# Patient Record
Sex: FEMALE | Race: WHITE | NOT HISPANIC OR LATINO | URBAN - METROPOLITAN AREA
[De-identification: names, ages, dates, MRNs, and addresses within clinical notes are randomized per-mention and may not be internally consistent; named-entity substitution may affect disease eponyms.]

---

## 2019-11-14 ENCOUNTER — APPOINTMENT (RX ONLY)
Dept: URBAN - METROPOLITAN AREA CLINIC 176 | Facility: CLINIC | Age: 49
Setting detail: DERMATOLOGY
End: 2019-11-14

## 2019-11-14 DIAGNOSIS — Z41.9 ENCOUNTER FOR PROCEDURE FOR PURPOSES OTHER THAN REMEDYING HEALTH STATE, UNSPECIFIED: ICD-10-CM

## 2019-11-14 PROCEDURE — ? BOTOX

## 2019-11-14 NOTE — PROCEDURE: BOTOX
Additional Area 2 Units: 0
Show Depressor Anguli Units: Yes
Dilution (U/0.1 Cc): 5
Show Right And Left Periorbital Units: No
Periorbital Skin Units: 10
Consent: Written consent obtained. Risks include but not limited to lid/brow ptosis, bruising, swelling, diplopia, temporary effect, incomplete chemical denervation.
Lot #: [id number]
Post-Care Instructions: Patient instructed to not lie down for 4 hours and limit physical activity for 24 hours. Patient instructed not to travel by airplane for 48 hours.
Detail Level: Detailed
Expiration Date (Month Year): 03/2022
Additional Area 1 Location: Orbicularis oris
Price (Use Numbers Only, No Special Characters Or $): 600

## 2019-11-14 NOTE — HPI: COSMETIC (BOTOX)
Have You Had Botox Before?: has had botox
Additional History: Patent had Botox done at another clinic in may.
When Was Your Last Botox Treatment?: 05/19/2019

## 2020-07-09 ENCOUNTER — APPOINTMENT (RX ONLY)
Dept: URBAN - METROPOLITAN AREA CLINIC 176 | Facility: CLINIC | Age: 50
Setting detail: DERMATOLOGY
End: 2020-07-09

## 2020-07-09 DIAGNOSIS — Z41.9 ENCOUNTER FOR PROCEDURE FOR PURPOSES OTHER THAN REMEDYING HEALTH STATE, UNSPECIFIED: ICD-10-CM

## 2020-07-09 PROCEDURE — ? BOTOX

## 2020-07-09 NOTE — PROCEDURE: BOTOX
Show Ucl Units: No
Glabellar Complex Units: 10
L Brow Units: 0
Show Additional Area 2: Yes
Additional Area 1 Location: Perioral
Dilution (U/0.1 Cc): 5
Detail Level: Detailed
Post-Care Instructions: Patient instructed to not lie down for 4 hours and limit physical activity for 24 hours. Patient instructed not to travel by airplane for 48 hours.
Expiration Date (Month Year): 11/2022
Price (Use Numbers Only, No Special Characters Or $): 400
Lot #: [id number]
Consent: Written consent obtained. Risks include but not limited to lid/brow ptosis, bruising, swelling, diplopia, temporary effect, incomplete chemical denervation.

## 2020-11-18 ENCOUNTER — APPOINTMENT (RX ONLY)
Dept: URBAN - METROPOLITAN AREA CLINIC 176 | Facility: CLINIC | Age: 50
Setting detail: DERMATOLOGY
End: 2020-11-18

## 2020-11-18 DIAGNOSIS — Z41.9 ENCOUNTER FOR PROCEDURE FOR PURPOSES OTHER THAN REMEDYING HEALTH STATE, UNSPECIFIED: ICD-10-CM

## 2020-11-18 PROCEDURE — ? DYSPORT

## 2020-11-18 NOTE — PROCEDURE: DYSPORT
Show Periorbital Units: Yes
Additional Comments: Has been doing botox 7 years, try dysport
Additional Area 2 Units: 10
Left Pupillary Line Units: 0
Depressor Anguli Oris Units: 15
Detail Level: Detailed
Lot #: [id number]
Periorbital Skin Units: 30
Price (Use Numbers Only, No Special Characters Or $): 369
Consent: Written consent obtained. Risks include but not limited to lid/brow ptosis, bruising, swelling, diplopia, temporary effect, incomplete chemical denervation.
Additional Area 3 Location: chin
Forehead Units: 20
Show Ucl Units: No
Glabellar Complex Units: 30
Post-Care Instructions: Patient instructed to not lie down for 4 hours and limit physical activity for 24 hours. Patient instructed not to travel by airplane for 48 hours.
Additional Area 1 Location: Submandibular gland
Additional Area 2 Location: Orbicularis oris
Expiration Date (Month Year): 12/31/2020

## 2021-03-18 ENCOUNTER — APPOINTMENT (RX ONLY)
Dept: URBAN - METROPOLITAN AREA CLINIC 176 | Facility: CLINIC | Age: 51
Setting detail: DERMATOLOGY
End: 2021-03-18

## 2021-03-18 DIAGNOSIS — Z41.9 ENCOUNTER FOR PROCEDURE FOR PURPOSES OTHER THAN REMEDYING HEALTH STATE, UNSPECIFIED: ICD-10-CM

## 2021-03-18 PROCEDURE — ? DYSPORT

## 2021-03-18 NOTE — PROCEDURE: DYSPORT
Show Periorbital Units: Yes
Additional Comments: Has been doing botox 7 years, try dysport
Additional Area 2 Units: 10
Left Pupillary Line Units: 0
Depressor Anguli Oris Units: 15
Detail Level: Detailed
Lot #: [id number]
Periorbital Skin Units: 30
Price (Use Numbers Only, No Special Characters Or $): 368
Consent: Written consent obtained. Risks include but not limited to lid/brow ptosis, bruising, swelling, diplopia, temporary effect, incomplete chemical denervation.
Additional Area 3 Location: chin
Forehead Units: 20
Show Ucl Units: No
Glabellar Complex Units: 30
Post-Care Instructions: Patient instructed to not lie down for 4 hours and limit physical activity for 24 hours. Patient instructed not to travel by airplane for 48 hours.
Additional Area 1 Location: Submandibular gland
Additional Area 2 Location: Orbicularis oris
Expiration Date (Month Year): 12/31/2020

## 2021-06-25 ENCOUNTER — APPOINTMENT (RX ONLY)
Dept: URBAN - METROPOLITAN AREA CLINIC 176 | Facility: CLINIC | Age: 51
Setting detail: DERMATOLOGY
End: 2021-06-25

## 2021-06-25 DIAGNOSIS — Z41.9 ENCOUNTER FOR PROCEDURE FOR PURPOSES OTHER THAN REMEDYING HEALTH STATE, UNSPECIFIED: ICD-10-CM

## 2021-06-25 PROCEDURE — ? DYSPORT

## 2021-06-25 NOTE — PROCEDURE: DYSPORT
Additional Area 2 Units: 15
Show Ucl Units: No
Show Additional Area 2: Yes
Additional Area 5 Units: 0
Price (Use Numbers Only, No Special Characters Or $): 400
Post-Care Instructions: Patient instructed to not lie down for 4 hours and limit physical activity for 24 hours. Patient instructed not to travel by airplane for 48 hours.
Glabellar Complex Units: 30
Additional Area 3 Location: chin
Detail Level: Detailed
Periorbital Skin Units: 22.5
Lot #: [id number]
Consent: Written consent obtained. Risks include but not limited to lid/brow ptosis, bruising, swelling, diplopia, temporary effect, incomplete chemical denervation.
Expiration Date (Month Year): 01/31/2022
Additional Area 2 Location: Orbicularis oris
Forehead Units: 30
Additional Area 3 Units: 7.5
Dilution (U/ 0.1cc): 10

## 2021-11-17 ENCOUNTER — APPOINTMENT (RX ONLY)
Dept: URBAN - METROPOLITAN AREA CLINIC 176 | Facility: CLINIC | Age: 51
Setting detail: DERMATOLOGY
End: 2021-11-17

## 2021-11-17 VITALS — WEIGHT: 130 LBS | HEIGHT: 70 IN

## 2021-11-17 DIAGNOSIS — Z41.9 ENCOUNTER FOR PROCEDURE FOR PURPOSES OTHER THAN REMEDYING HEALTH STATE, UNSPECIFIED: ICD-10-CM

## 2021-11-17 PROCEDURE — ? DYSPORT

## 2021-11-17 PROCEDURE — ? TEOSYAL RHA 4 INJECTION

## 2021-11-17 NOTE — PROCEDURE: DYSPORT
Show Right And Left Periorbital Units: No
R Brow Units: 0
Show Additional Area 3: Yes
Additional Area 1 Location: Chin
Consent: Written consent obtained. Risks include but not limited to lid/brow ptosis, bruising, swelling, diplopia, temporary effect, incomplete chemical denervation.
Additional Area 1 Units: 7.5
Expiration Date (Month Year): 04/30/2022
Post-Care Instructions: Patient instructed to not lie down for 4 hours and limit physical activity for 24 hours. Patient instructed not to travel by airplane for 48 hours.
Glabellar Complex Units: 30
Lot #: [id number]
Periorbital Skin Units: 35
Price (Use Numbers Only, No Special Characters Or $): 400
Detail Level: Detailed
Additional Area 2 Location: Orbicularis oris
Forehead Units: 30
Dilution (U/ 0.1cc): 10

## 2021-11-17 NOTE — PROCEDURE: TEOSYAL RHA 4 INJECTION
Brows Filler  Volume In Cc: 0
Include Cannula Information In Note?: No
Detail Level: Detailed
Anesthesia Volume In Cc: 0.5
Map Statment: See Attach Map for Complete Details
Expiration Date (Month Year): 2023-12-12
Filler: Teosyal RHA 4
Price (Use Numbers Only, No Special Characters Or $): 032
Procedural Text: The filler was administered to the treatment areas noted above.
Consent: Written consent obtained. Risks include but not limited to bruising, beading, irregular texture, ulceration, infection, allergic reaction, scar formation, incomplete augmentation, temporary nature, procedural pain.
Anesthesia Type: 1% lidocaine with epinephrine
Additional Anesthesia Volume In Cc: 6
Post-Care Instructions: Patient instructed to apply ice to reduce swelling.
Lot #: [id number]
Number Of Syringes (Required For Inventory): 1

## 2022-03-10 ENCOUNTER — APPOINTMENT (RX ONLY)
Dept: URBAN - METROPOLITAN AREA CLINIC 176 | Facility: CLINIC | Age: 52
Setting detail: DERMATOLOGY
End: 2022-03-10

## 2022-03-10 DIAGNOSIS — Z41.9 ENCOUNTER FOR PROCEDURE FOR PURPOSES OTHER THAN REMEDYING HEALTH STATE, UNSPECIFIED: ICD-10-CM

## 2022-03-10 PROCEDURE — ? DYSPORT

## 2022-03-10 PROCEDURE — ? TEOSYAL RHA 3 INJECTION

## 2022-03-10 NOTE — PROCEDURE: TEOSYAL RHA 3 INJECTION
Mid Face Filler  Volume In Cc: 0
Nasolabial Folds Filler Volume In Cc: 0.6
Price (Use Numbers Only, No Special Characters Or $): 086
Anesthesia Type: 1% lidocaine with epinephrine
Consent: Written consent obtained. Risks include but not limited to bruising, beading, irregular texture, ulceration, infection, allergic reaction, scar formation, incomplete augmentation, temporary nature, procedural pain.
Additional Anesthesia Volume In Cc: 6
Procedural Text: The filler was administered to the treatment areas noted above.
Use Map Statement For Sites (Optional): No
Number Of Syringes (Required For Inventory): 1
Marionette Lines Filler  Volume In Cc: 0.4
Include Cannula Size?: 25G
Lot #: [id number]
Include Cannula Information In Note?: Yes
Post-Care Instructions: Patient instructed to apply ice to reduce swelling.
Detail Level: Detailed
Anesthesia Volume In Cc: 0.5
Map Statment: See Attach Map for Complete Details
Include Cannula Length?: 1.5 inch
Expiration Date (Month Year): 2024-03-26
Filler: Teosyal RHA 3

## 2022-03-10 NOTE — PROCEDURE: DYSPORT
Show Right And Left Periorbital Units: No
R Brow Units: 0
Show Additional Area 3: Yes
Additional Area 1 Location: Chin
Consent: Written consent obtained. Risks include but not limited to lid/brow ptosis, bruising, swelling, diplopia, temporary effect, incomplete chemical denervation.
Additional Area 1 Units: 15
Expiration Date (Month Year): 8/31/2022
Post-Care Instructions: Patient instructed to not lie down for 4 hours and limit physical activity for 24 hours. Patient instructed not to travel by airplane for 48 hours.
Lot #: [id number]
Periorbital Skin Units: 35
Price (Use Numbers Only, No Special Characters Or $): 400
Detail Level: Detailed
Additional Area 2 Location: Orbicularis oris
Forehead Units: 25
Dilution (U/ 0.1cc): 10
Additional Comments: Put less in glabella, more around mouth.

## 2022-06-09 ENCOUNTER — APPOINTMENT (RX ONLY)
Dept: URBAN - METROPOLITAN AREA CLINIC 176 | Facility: CLINIC | Age: 52
Setting detail: DERMATOLOGY
End: 2022-06-09

## 2022-06-09 VITALS — HEIGHT: 70 IN | WEIGHT: 135 LBS

## 2022-06-09 DIAGNOSIS — Z41.9 ENCOUNTER FOR PROCEDURE FOR PURPOSES OTHER THAN REMEDYING HEALTH STATE, UNSPECIFIED: ICD-10-CM

## 2022-06-09 PROCEDURE — ? DYSPORT

## 2022-06-09 NOTE — PROCEDURE: DYSPORT
Show Right And Left Periorbital Units: No
R Brow Units: 0
Show Additional Area 3: Yes
Additional Area 1 Location: Chin
Consent: Written consent obtained. Risks include but not limited to lid/brow ptosis, bruising, swelling, diplopia, temporary effect, incomplete chemical denervation.
Additional Area 1 Units: 15
Expiration Date (Month Year): 9/30/2022
Post-Care Instructions: Patient instructed to not lie down for 4 hours and limit physical activity for 24 hours. Patient instructed not to travel by airplane for 48 hours.
Lot #: [id number]
Periorbital Skin Units: 35
Price (Use Numbers Only, No Special Characters Or $): 400
Detail Level: Detailed
Additional Area 2 Location: Orbicularis oris
Forehead Units: 25
Dilution (U/ 0.1cc): 10
Additional Comments: Put less in glabella, more around mouth.

## 2022-10-18 ENCOUNTER — APPOINTMENT (RX ONLY)
Dept: URBAN - METROPOLITAN AREA CLINIC 176 | Facility: CLINIC | Age: 52
Setting detail: DERMATOLOGY
End: 2022-10-18

## 2022-10-18 DIAGNOSIS — Z41.9 ENCOUNTER FOR PROCEDURE FOR PURPOSES OTHER THAN REMEDYING HEALTH STATE, UNSPECIFIED: ICD-10-CM

## 2022-10-18 PROCEDURE — ? DYSPORT

## 2022-10-18 NOTE — PROCEDURE: DYSPORT
Show Right And Left Periorbital Units: No
R Brow Units: 0
Show Additional Area 3: Yes
Additional Area 1 Location: Chin
Consent: Written consent obtained. Risks include but not limited to lid/brow ptosis, bruising, swelling, diplopia, temporary effect, incomplete chemical denervation.
Additional Area 1 Units: 10
Expiration Date (Month Year): 2/28/23
Post-Care Instructions: Patient instructed to not lie down for 4 hours and limit physical activity for 24 hours. Patient instructed not to travel by airplane for 48 hours.
Glabellar Complex Units: 30
Lot #: [id number]
Periorbital Skin Units: 30
Price (Use Numbers Only, No Special Characters Or $): 400
Detail Level: Detailed
Additional Area 2 Location: Orbicularis oris
Additional Comments: Put more in glabella, less around mouth

## 2022-11-23 ENCOUNTER — APPOINTMENT (RX ONLY)
Dept: URBAN - METROPOLITAN AREA CLINIC 176 | Facility: CLINIC | Age: 52
Setting detail: DERMATOLOGY
End: 2022-11-23

## 2022-11-23 VITALS — WEIGHT: 135 LBS | HEIGHT: 70 IN

## 2022-11-23 DIAGNOSIS — Z41.9 ENCOUNTER FOR PROCEDURE FOR PURPOSES OTHER THAN REMEDYING HEALTH STATE, UNSPECIFIED: ICD-10-CM

## 2022-11-23 PROCEDURE — ? TEOSYAL RHA 4 INJECTION

## 2022-11-23 PROCEDURE — ? ADDITIONAL NOTES

## 2022-11-23 PROCEDURE — ? TEOSYAL RHA 3 INJECTION

## 2022-11-23 NOTE — PROCEDURE: TEOSYAL RHA 4 INJECTION
Cheeks Filler Volume In Cc: 0
Detail Level: Detailed
Additional Area 1 Volume In Cc: 0.3
Include Cannula Information In Note?: No
Lot #: [id number]
Post-Care Instructions: Patient instructed to apply ice to reduce swelling.
Anesthesia Volume In Cc: 0.5
Expiration Date (Month Year): 2024-02-21
Filler: Teosyal RHA 4
Map Statment: See Attach Map for Complete Details
Additional Area 1 Location: chin
Anesthesia Type: 1% lidocaine with epinephrine
Nasolabial Folds Filler Volume In Cc: 0.2
Number Of Syringes (Required For Inventory): 1
Consent: Written consent obtained. Risks include but not limited to bruising, beading, irregular texture, ulceration, infection, allergic reaction, scar formation, incomplete augmentation, temporary nature, procedural pain.
Procedural Text: The filler was administered to the treatment areas noted above.
Additional Anesthesia Volume In Cc: 6

## 2022-11-23 NOTE — PROCEDURE: ADDITIONAL NOTES
Additional Notes: Hylenex injection done for bruising on the right jaw. [id number]; 11/2022.
Render Risk Assessment In Note?: yes
Detail Level: Detailed

## 2022-11-23 NOTE — PROCEDURE: TEOSYAL RHA 3 INJECTION
Cheeks Filler Volume In Cc: 1
Anesthesia Volume In Cc: 0.5
Brows Filler  Volume In Cc: 0
Post-Care Instructions: Patient instructed to apply ice to reduce swelling.
Include Cannula Information In Note?: No
Lot #: [id number]
Expiration Date (Month Year): 2025-04-04
Map Statment: See Attach Map for Complete Details
Detail Level: Detailed
Price (Use Numbers Only, No Special Characters Or $): 013
Filler: Teosyal RHA 3
Consent: Written consent obtained. Risks include but not limited to bruising, beading, irregular texture, ulceration, infection, allergic reaction, scar formation, incomplete augmentation, temporary nature, procedural pain.
Anesthesia Type: 1% lidocaine with epinephrine
Additional Anesthesia Volume In Cc: 6
Procedural Text: The filler was administered to the treatment areas noted above.
Map Statment: See Attach Map for Complete Details
Expiration Date (Month Year): 2024-11-15
Price (Use Numbers Only, No Special Characters Or $): 431
Lot #: [id number]

## 2023-02-02 ENCOUNTER — APPOINTMENT (RX ONLY)
Dept: URBAN - METROPOLITAN AREA CLINIC 176 | Facility: CLINIC | Age: 53
Setting detail: DERMATOLOGY
End: 2023-02-02

## 2023-02-02 DIAGNOSIS — Z41.9 ENCOUNTER FOR PROCEDURE FOR PURPOSES OTHER THAN REMEDYING HEALTH STATE, UNSPECIFIED: ICD-10-CM

## 2023-02-02 PROCEDURE — ? DYSPORT

## 2023-02-02 NOTE — PROCEDURE: DYSPORT
Show Right And Left Periorbital Units: No
R Brow Units: 0
Show Additional Area 3: Yes
Additional Area 1 Location: Chin
Consent: Written consent obtained. Risks include but not limited to lid/brow ptosis, bruising, swelling, diplopia, temporary effect, incomplete chemical denervation.
Expiration Date (Month Year): 2/28/23
Post-Care Instructions: Patient instructed to not lie down for 4 hours and limit physical activity for 24 hours. Patient instructed not to travel by airplane for 48 hours.
Glabellar Complex Units: 30
Lot #: [id number]
Periorbital Skin Units: 30
Price (Use Numbers Only, No Special Characters Or $): 400
Detail Level: Detailed
Additional Area 2 Location: Orbicularis oris
Dilution (U/ 0.1cc): 10
Additional Comments: Put more in glabella, less around mouth
Depressor Anguli Oris Units: 15

## 2023-05-24 ENCOUNTER — APPOINTMENT (RX ONLY)
Dept: URBAN - METROPOLITAN AREA CLINIC 176 | Facility: CLINIC | Age: 53
Setting detail: DERMATOLOGY
End: 2023-05-24

## 2023-05-24 ENCOUNTER — RX ONLY (OUTPATIENT)
Age: 53
Setting detail: RX ONLY
End: 2023-05-24

## 2023-05-24 DIAGNOSIS — Z41.9 ENCOUNTER FOR PROCEDURE FOR PURPOSES OTHER THAN REMEDYING HEALTH STATE, UNSPECIFIED: ICD-10-CM

## 2023-05-24 PROCEDURE — ? DYSPORT

## 2023-05-24 RX ORDER — BIMATOPROST 0.3 MG/ML
SOLUTION/ DROPS OPHTHALMIC
Qty: 5 | Refills: 11 | Status: ERX | COMMUNITY
Start: 2023-05-24

## 2023-05-24 NOTE — PROCEDURE: DYSPORT
Show Right And Left Periorbital Units: No
R Brow Units: 0
Show Additional Area 3: Yes
Additional Area 1 Location: Chin
Consent: Written consent obtained. Risks include but not limited to lid/brow ptosis, bruising, swelling, diplopia, temporary effect, incomplete chemical denervation.
Expiration Date (Month Year): 09/30/23
Post-Care Instructions: Patient instructed to not lie down for 4 hours and limit physical activity for 24 hours. Patient instructed not to travel by airplane for 48 hours.
Glabellar Complex Units: 30
Lot #: [id number]
Periorbital Skin Units: 30
Price (Use Numbers Only, No Special Characters Or $): 400
Detail Level: Detailed
Additional Area 2 Location: Orbicularis oris
Dilution (U/ 0.1cc): 10
Additional Comments: Put more in glabella, less around mouth
Depressor Anguli Oris Units: 15

## 2023-09-05 ENCOUNTER — APPOINTMENT (RX ONLY)
Dept: URBAN - METROPOLITAN AREA CLINIC 176 | Facility: CLINIC | Age: 53
Setting detail: DERMATOLOGY
End: 2023-09-05

## 2023-09-05 DIAGNOSIS — Z41.9 ENCOUNTER FOR PROCEDURE FOR PURPOSES OTHER THAN REMEDYING HEALTH STATE, UNSPECIFIED: ICD-10-CM

## 2023-09-05 PROCEDURE — ? ADDITIONAL NOTES

## 2023-09-05 PROCEDURE — ? DYSPORT

## 2023-09-05 NOTE — PROCEDURE: DYSPORT
Show Right And Left Periorbital Units: No
R Brow Units: 0
Show Additional Area 3: Yes
Additional Area 1 Location: Chin
Consent: Written consent obtained. Risks include but not limited to lid/brow ptosis, bruising, swelling, diplopia, temporary effect, incomplete chemical denervation.
Expiration Date (Month Year): 09/30/23
Post-Care Instructions: Patient instructed to not lie down for 4 hours and limit physical activity for 24 hours. Patient instructed not to travel by airplane for 48 hours.
Glabellar Complex Units: 30
Lot #: [id number]
Periorbital Skin Units: 30
Price (Use Numbers Only, No Special Characters Or $): 400
Detail Level: Detailed
Additional Area 2 Location: Orbicularis oris
Dilution (U/ 0.1cc): 10
Additional Comments: Put more in glabella, less around mouth
Depressor Anguli Oris Units: 15
O-L Flap Text: The defect edges were debeveled with a #15 scalpel blade.  Given the location of the defect, shape of the defect and the proximity to free margins an O-L flap was deemed most appropriate.  Using a sterile surgical marker, an appropriate advancement flap was drawn incorporating the defect and placing the expected incisions within the relaxed skin tension lines where possible.    The area thus outlined was incised deep to adipose tissue with a #15 scalpel blade.  The skin margins were undermined to an appropriate distance in all directions utilizing iris scissors.

## 2023-09-05 NOTE — PROCEDURE: ADDITIONAL NOTES
Detail Level: Detailed
Render Risk Assessment In Note?: yes
Additional Notes: Discussed B2G1 filler promo. Suggested volbella in the lips, chin and cheeks. Patient does not like a not natural lip. Can consider adding half of a syringe. If doesn’t like the lip filler, patient aware it can always be dissolved.

## 2023-10-24 ENCOUNTER — APPOINTMENT (RX ONLY)
Dept: URBAN - METROPOLITAN AREA CLINIC 176 | Facility: CLINIC | Age: 53
Setting detail: DERMATOLOGY
End: 2023-10-24

## 2023-10-24 DIAGNOSIS — Z41.9 ENCOUNTER FOR PROCEDURE FOR PURPOSES OTHER THAN REMEDYING HEALTH STATE, UNSPECIFIED: ICD-10-CM

## 2023-10-24 PROCEDURE — ? HYALURONIDASE INJECTION

## 2023-10-24 PROCEDURE — ? ADDITIONAL NOTES

## 2023-10-24 ASSESSMENT — LOCATION DETAILED DESCRIPTION DERM
LOCATION DETAILED: LEFT SUPERIOR VERMILION LIP
LOCATION DETAILED: LEFT SUPERIOR MUCOSAL LIP
LOCATION DETAILED: RIGHT SUPERIOR MUCOSAL LIP

## 2023-10-24 ASSESSMENT — LOCATION SIMPLE DESCRIPTION DERM
LOCATION SIMPLE: LEFT SUPERIOR LIP
LOCATION SIMPLE: RIGHT SUPERIOR MUCOSAL LIP
LOCATION SIMPLE: LEFT SUPERIOR MUCOSAL LIP

## 2023-10-24 ASSESSMENT — LOCATION ZONE DERM: LOCATION ZONE: LIP

## 2023-10-24 NOTE — PROCEDURE: HYALURONIDASE INJECTION
Price (Use Numbers Only, No Special Characters Or $): 105
Consent: The risks of contour defects and dimpling of the skin were reviewed with the patient prior to the injection.
Expiration Date (Optional): 5/2020
Lot # (Optional): [id number]
Administered By (Optional): Briana Gill
Detail Level: Detailed
Total Volume (Ccs): 1
Filler Previously Used (Optional): juvederm from outside office
Hyaluronidase Preparation: hyaluronidase

## 2023-10-24 NOTE — PROCEDURE: ADDITIONAL NOTES
Detail Level: Detailed
Render Risk Assessment In Note?: yes
Additional Notes: Patient is not a good candidate for Sculptra due to having autoimmune issues. 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 Patient has not had  the lips in our office in the last 3 years.

## 2023-11-13 ENCOUNTER — APPOINTMENT (RX ONLY)
Dept: URBAN - METROPOLITAN AREA CLINIC 176 | Facility: CLINIC | Age: 53
Setting detail: DERMATOLOGY
End: 2023-11-13

## 2023-11-13 DIAGNOSIS — Z41.9 ENCOUNTER FOR PROCEDURE FOR PURPOSES OTHER THAN REMEDYING HEALTH STATE, UNSPECIFIED: ICD-10-CM

## 2023-11-13 PROCEDURE — ? HYALURONIDASE INJECTION

## 2023-11-13 PROCEDURE — ? ADDITIONAL NOTES

## 2023-11-13 ASSESSMENT — LOCATION DETAILED DESCRIPTION DERM
LOCATION DETAILED: RIGHT SUPERIOR MUCOSAL LIP
LOCATION DETAILED: LEFT SUPERIOR VERMILION LIP
LOCATION DETAILED: LEFT SUPERIOR MUCOSAL LIP

## 2023-11-13 ASSESSMENT — LOCATION ZONE DERM: LOCATION ZONE: LIP

## 2023-11-13 ASSESSMENT — LOCATION SIMPLE DESCRIPTION DERM
LOCATION SIMPLE: LEFT SUPERIOR MUCOSAL LIP
LOCATION SIMPLE: RIGHT SUPERIOR MUCOSAL LIP
LOCATION SIMPLE: LEFT SUPERIOR LIP

## 2023-11-13 NOTE — PROCEDURE: HYALURONIDASE INJECTION
Price (Use Numbers Only, No Special Characters Or $): 0
Consent: The risks of contour defects and dimpling of the skin were reviewed with the patient prior to the injection.
Expiration Date (Optional): 5/2020
Lot # (Optional): [id number]
Administered By (Optional): Briana Gill
Detail Level: Detailed
Total Volume (Ccs): 1
Filler Previously Used (Optional): juvederm from outside office
Hyaluronidase Preparation: hyaluronidase

## 2024-01-05 ENCOUNTER — APPOINTMENT (RX ONLY)
Dept: URBAN - METROPOLITAN AREA CLINIC 176 | Facility: CLINIC | Age: 54
Setting detail: DERMATOLOGY
End: 2024-01-05

## 2024-01-05 DIAGNOSIS — Z41.9 ENCOUNTER FOR PROCEDURE FOR PURPOSES OTHER THAN REMEDYING HEALTH STATE, UNSPECIFIED: ICD-10-CM

## 2024-01-05 PROCEDURE — ? TEOSYAL RHA 4 INJECTION

## 2024-01-05 PROCEDURE — ? TEOSYAL RHA 2 INJECTION

## 2024-01-05 PROCEDURE — ? TEOSYAL RHA 3 INJECTION

## 2024-01-05 NOTE — PROCEDURE: TEOSYAL RHA 3 INJECTION
Marionette Lines Filler Volume In Cc: 0.6
Topical Anesthesia?: BLT ointment (benzocaine 20%, lidocaine 10%, tetracaine 10%)
Brows Filler Volume In Cc: 0
Anesthesia Volume In Cc: 0.5
Detail Level: Detailed
Map Statment: See Attach Map for Complete Details
Expiration Date (Month Year): 2024-01-11
Filler: Teosyal RHA 3
Procedural Text: The filler was administered to the treatment areas noted above.
Additional Area 1 Location: Chin
Consent: Written consent obtained. Risks include but not limited to bruising, beading, irregular texture, ulceration, infection, allergic reaction, scar formation, incomplete augmentation, temporary nature, procedural pain.
Anesthesia Type: 1% lidocaine with epinephrine
Additional Anesthesia Volume In Cc: 6
Number Of Syringes (Required For Inventory): 1
Use Map Statement For Sites (Optional): No
Lot #: [id number]
Post-Care Instructions: Patient instructed to apply ice to reduce swelling.
Additional Area 1 Volume In Cc: 0.4

## 2024-01-05 NOTE — PROCEDURE: TEOSYAL RHA 4 INJECTION
Vermilion Lips Filler Volume In Cc: 0
Additional Anesthesia Volume In Cc: 6
Use Map Statement For Sites (Optional): No
Number Of Syringes (Required For Inventory): 1
Lot #: [id number]
Post-Care Instructions: Patient instructed to apply ice to reduce swelling.
Topical Anesthesia?: BLT gel (benzocaine 20%, lidocaine 10%, tetracaine 10%)
Cheeks Filler Volume In Cc: 0.8
Anesthesia Volume In Cc: 0.5
Detail Level: Detailed
Map Statment: See Attach Map for Complete Details
Expiration Date (Month Year): 2024-06-18
Jawline Filler Volume In Cc: 0.2
Filler: Teosyal RHA 4
Price (Use Numbers Only, No Special Characters Or $): 086
Anesthesia Type: 1% lidocaine with epinephrine
Procedural Text: The filler was administered to the treatment areas noted above.
Consent: Written consent obtained. Risks include but not limited to bruising, beading, irregular texture, ulceration, infection, allergic reaction, scar formation, incomplete augmentation, temporary nature, procedural pain.

## 2024-01-05 NOTE — PROCEDURE: TEOSYAL RHA 2 INJECTION
Cheeks Filler Volume In Cc: 0.3
Anesthesia Volume In Cc: 0.5
Tear Troughs Filler Volume In Cc: 0
Detail Level: Detailed
Map Statment: See Attach Map for Complete Details
Expiration Date (Month Year): 2025-02-21
Filler: Teosyal RHA 2
Price (Use Numbers Only, No Special Characters Or $): 934
Consent: Written consent obtained. Risks include but not limited to bruising, beading, irregular texture, ulceration, infection, allergic reaction, scar formation, incomplete augmentation, temporary nature, procedural pain.
Procedural Text: The filler was administered to the treatment areas noted above.
Anesthesia Type: 1% lidocaine with epinephrine
Vermilion Lips Filler Volume In Cc: 0.7
Additional Anesthesia Volume In Cc: 6
Use Map Statement For Sites (Optional): No
Number Of Syringes (Required For Inventory): 1
Lot #: [id number]
Post-Care Instructions: Patient instructed to apply ice to reduce swelling.
Topical Anesthesia?: BLT ointment (benzocaine 20%, lidocaine 10%, tetracaine 10%)

## 2024-03-04 ENCOUNTER — APPOINTMENT (RX ONLY)
Dept: URBAN - METROPOLITAN AREA CLINIC 176 | Facility: CLINIC | Age: 54
Setting detail: DERMATOLOGY
End: 2024-03-04

## 2024-03-04 DIAGNOSIS — Z41.9 ENCOUNTER FOR PROCEDURE FOR PURPOSES OTHER THAN REMEDYING HEALTH STATE, UNSPECIFIED: ICD-10-CM

## 2024-03-04 PROCEDURE — ? DYSPORT

## 2024-03-04 NOTE — PROCEDURE: DYSPORT
Show Right And Left Periorbital Units: No
R Brow Units: 0
Show Additional Area 3: Yes
Additional Area 1 Location: Chin
Consent: Written consent obtained. Risks include but not limited to lid/brow ptosis, bruising, swelling, diplopia, temporary effect, incomplete chemical denervation.
Expiration Date (Month Year): 6/30/2025
Post-Care Instructions: Patient instructed to not lie down for 4 hours and limit physical activity for 24 hours. Patient instructed not to travel by airplane for 48 hours.
Glabellar Complex Units: 30
Lot #: [id number]
Price (Use Numbers Only, No Special Characters Or $): 400
Detail Level: Detailed
Additional Area 2 Location: Orbicularis oris
Dilution (U/0.1 Cc): 10
Additional Comments: Put more in glabella, less around mouth
Depressor Anguli Oris Units: 15

## 2024-06-05 ENCOUNTER — APPOINTMENT (RX ONLY)
Dept: URBAN - METROPOLITAN AREA CLINIC 176 | Facility: CLINIC | Age: 54
Setting detail: DERMATOLOGY
End: 2024-06-05

## 2024-06-05 ENCOUNTER — RX ONLY (OUTPATIENT)
Age: 54
Setting detail: RX ONLY
End: 2024-06-05

## 2024-06-05 DIAGNOSIS — L84 CORNS AND CALLOSITIES: ICD-10-CM

## 2024-06-05 DIAGNOSIS — Z41.9 ENCOUNTER FOR PROCEDURE FOR PURPOSES OTHER THAN REMEDYING HEALTH STATE, UNSPECIFIED: ICD-10-CM

## 2024-06-05 PROCEDURE — ? ADDITIONAL NOTES

## 2024-06-05 PROCEDURE — ? DYSPORT

## 2024-06-05 RX ORDER — BIMATOPROST 0.3 MG/ML
SOLUTION/ DROPS OPHTHALMIC
Qty: 5 | Refills: 11 | Status: ERX

## 2024-06-05 ASSESSMENT — LOCATION ZONE DERM: LOCATION ZONE: FEET

## 2024-06-05 ASSESSMENT — LOCATION SIMPLE DESCRIPTION DERM: LOCATION SIMPLE: RIGHT PLANTAR SURFACE

## 2024-06-05 ASSESSMENT — LOCATION DETAILED DESCRIPTION DERM: LOCATION DETAILED: RIGHT PLANTAR FOREFOOT OVERLYING 2ND METATARSAL

## 2024-06-05 NOTE — PROCEDURE: DYSPORT
Show Right And Left Periorbital Units: No
R Brow Units: 0
Show Additional Area 3: Yes
Additional Area 1 Location: Chin
Consent: Written consent obtained. Risks include but not limited to lid/brow ptosis, bruising, swelling, diplopia, temporary effect, incomplete chemical denervation.
Expiration Date (Month Year): 9/30/2025
Post-Care Instructions: Patient instructed to not lie down for 4 hours and limit physical activity for 24 hours. Patient instructed not to travel by airplane for 48 hours.
Glabellar Complex Units: 30
Lot #: [id number]
Price (Use Numbers Only, No Special Characters Or $): 400
Detail Level: Detailed
Additional Area 2 Location: Orbicularis oris
Dilution (U/0.1 Cc): 10
Additional Comments: Put more in glabella, less around mouth
Depressor Anguli Oris Units: 15

## 2024-06-05 NOTE — PROCEDURE: ADDITIONAL NOTES
Render Risk Assessment In Note?: yes
Detail Level: Detailed
Additional Notes: Recommended corn pads, or exfoliating pedicure.

## 2024-08-29 ENCOUNTER — APPOINTMENT (RX ONLY)
Dept: URBAN - METROPOLITAN AREA CLINIC 176 | Facility: CLINIC | Age: 54
Setting detail: DERMATOLOGY
End: 2024-08-29

## 2024-08-29 DIAGNOSIS — Z41.9 ENCOUNTER FOR PROCEDURE FOR PURPOSES OTHER THAN REMEDYING HEALTH STATE, UNSPECIFIED: ICD-10-CM

## 2024-08-29 PROCEDURE — ? DYSPORT

## 2024-08-29 NOTE — PROCEDURE: DYSPORT
Show Right And Left Periorbital Units: No
R Brow Units: 0
Show Additional Area 3: Yes
Additional Area 1 Location: Chin
Consent: Written consent obtained. Risks include but not limited to lid/brow ptosis, bruising, swelling, diplopia, temporary effect, incomplete chemical denervation.
Expiration Date (Month Year): 12/31/2025
Post-Care Instructions: Patient instructed to not lie down for 4 hours and limit physical activity for 24 hours. Patient instructed not to travel by airplane for 48 hours.
Glabellar Complex Units: 30
Lot #: [id number]
Price (Use Numbers Only, No Special Characters Or $): 500
Detail Level: Detailed
Additional Area 2 Location: Orbicularis oris
Dilution (U/0.1 Cc): 10
Depressor Anguli Oris Units: 15

## 2024-11-18 ENCOUNTER — APPOINTMENT (RX ONLY)
Dept: URBAN - METROPOLITAN AREA CLINIC 176 | Facility: CLINIC | Age: 54
Setting detail: DERMATOLOGY
End: 2024-11-18

## 2024-11-18 DIAGNOSIS — Z41.9 ENCOUNTER FOR PROCEDURE FOR PURPOSES OTHER THAN REMEDYING HEALTH STATE, UNSPECIFIED: ICD-10-CM

## 2024-11-18 DIAGNOSIS — L73.8 OTHER SPECIFIED FOLLICULAR DISORDERS: ICD-10-CM

## 2024-11-18 PROCEDURE — ? ADDITIONAL NOTES

## 2024-11-18 PROCEDURE — ? DYSPORT

## 2024-11-18 PROCEDURE — ? COUNSELING

## 2024-11-18 PROCEDURE — ? PRESCRIPTION

## 2024-11-18 RX ORDER — BIMATOPROST 0.3 MG/ML
SOLUTION/ DROPS OPHTHALMIC
Qty: 5 | Refills: 3 | Status: ERX | COMMUNITY
Start: 2024-11-18

## 2024-11-18 RX ADMIN — BIMATOPROST: 0.3 SOLUTION/ DROPS OPHTHALMIC at 00:00

## 2024-11-18 ASSESSMENT — LOCATION DETAILED DESCRIPTION DERM
LOCATION DETAILED: RIGHT INFERIOR LATERAL FOREHEAD
LOCATION DETAILED: RIGHT INFERIOR FOREHEAD
LOCATION DETAILED: LEFT LATERAL FOREHEAD
LOCATION DETAILED: LEFT FOREHEAD

## 2024-11-18 ASSESSMENT — LOCATION SIMPLE DESCRIPTION DERM
LOCATION SIMPLE: LEFT FOREHEAD
LOCATION SIMPLE: RIGHT FOREHEAD

## 2024-11-18 ASSESSMENT — LOCATION ZONE DERM: LOCATION ZONE: FACE

## 2024-11-18 NOTE — PROCEDURE: DYSPORT
Show Right And Left Periorbital Units: No
R Brow Units: 0
Show Additional Area 3: Yes
Additional Area 1 Location: Chin
Consent: Written consent obtained. Risks include but not limited to lid/brow ptosis, bruising, swelling, diplopia, temporary effect, incomplete chemical denervation.
Expiration Date (Month Year): 12/31/2025
Post-Care Instructions: Patient instructed to not lie down for 4 hours and limit physical activity for 24 hours. Patient instructed not to travel by airplane for 48 hours.
Glabellar Complex Units: 30
Lot #: [id number]
Price (Use Numbers Only, No Special Characters Or $): 500
Detail Level: Detailed
Additional Area 2 Location: Orbicularis oris
Dilution (U/0.1 Cc): 10
Depressor Anguli Oris Units: 15

## 2025-04-01 ENCOUNTER — APPOINTMENT (OUTPATIENT)
Dept: URBAN - METROPOLITAN AREA CLINIC 176 | Facility: CLINIC | Age: 55
Setting detail: DERMATOLOGY
End: 2025-04-01

## 2025-04-01 DIAGNOSIS — Z41.9 ENCOUNTER FOR PROCEDURE FOR PURPOSES OTHER THAN REMEDYING HEALTH STATE, UNSPECIFIED: ICD-10-CM

## 2025-04-01 PROCEDURE — ? DYSPORT

## 2025-04-01 PROCEDURE — ? TEOSYAL RHA 3 INJECTION

## 2025-04-01 PROCEDURE — ? TEOSYAL RHA 2 INJECTION

## 2025-04-01 PROCEDURE — ? TEOSYAL RHA 4 INJECTION

## 2025-04-01 NOTE — PROCEDURE: TEOSYAL RHA 3 INJECTION
Marionette Lines Filler Volume In Cc: 0.6
Topical Anesthesia?: BLT ointment (benzocaine 20%, lidocaine 10%, tetracaine 10%)
Brows Filler Volume In Cc: 0
Anesthesia Volume In Cc: 0.5
Detail Level: Detailed
Map Statment: See Attach Map for Complete Details
Expiration Date (Month Year): 02/10/2027
Filler: Teosyal RHA 3
Price (Use Numbers Only, No Special Characters Or $): 760
Procedural Text: The filler was administered to the treatment areas noted above.
Additional Area 1 Location: Chin
Consent: Written consent obtained. Risks include but not limited to bruising, beading, irregular texture, ulceration, infection, allergic reaction, scar formation, incomplete augmentation, temporary nature, procedural pain.
Anesthesia Type: 1% lidocaine with epinephrine
Additional Anesthesia Volume In Cc: 6
Number Of Syringes (Required For Inventory): 1
Use Map Statement For Sites (Optional): No
Lot #: [id number]
Post-Care Instructions: Patient instructed to apply ice to reduce swelling.
Additional Area 1 Volume In Cc: 0.4

## 2025-04-01 NOTE — PROCEDURE: TEOSYAL RHA 2 INJECTION
Cheeks Filler Volume In Cc: 0.3
Anesthesia Volume In Cc: 0.5
Tear Troughs Filler Volume In Cc: 0
Detail Level: Detailed
Map Statment: See Attach Map for Complete Details
Expiration Date (Month Year): 05/13/2025
Filler: Teosyal RHA 2
Price (Use Numbers Only, No Special Characters Or $): 642
Consent: Written consent obtained. Risks include but not limited to bruising, beading, irregular texture, ulceration, infection, allergic reaction, scar formation, incomplete augmentation, temporary nature, procedural pain.
Procedural Text: The filler was administered to the treatment areas noted above.
Anesthesia Type: 1% lidocaine with epinephrine
Vermilion Lips Filler Volume In Cc: 0.7
Additional Anesthesia Volume In Cc: 6
Use Map Statement For Sites (Optional): No
Number Of Syringes (Required For Inventory): 1
Lot #: [id number]
Post-Care Instructions: Patient instructed to apply ice to reduce swelling.
Topical Anesthesia?: BLT ointment (benzocaine 20%, lidocaine 10%, tetracaine 10%)

## 2025-04-01 NOTE — PROCEDURE: TEOSYAL RHA 4 INJECTION
Vermilion Lips Filler Volume In Cc: 0
Additional Anesthesia Volume In Cc: 6
Use Map Statement For Sites (Optional): No
Number Of Syringes (Required For Inventory): 1
Lot #: [id number]
Post-Care Instructions: Patient instructed to apply ice to reduce swelling.
Topical Anesthesia?: BLT gel (benzocaine 20%, lidocaine 10%, tetracaine 10%)
Cheeks Filler Volume In Cc: 0.8
Anesthesia Volume In Cc: 0.5
Detail Level: Detailed
Map Statment: See Attach Map for Complete Details
Expiration Date (Month Year): 06/01/2026
Jawline Filler Volume In Cc: 0.2
Filler: Teosyal RHA 4
Price (Use Numbers Only, No Special Characters Or $): 738
Anesthesia Type: 1% lidocaine with epinephrine
Procedural Text: The filler was administered to the treatment areas noted above.
Consent: Written consent obtained. Risks include but not limited to bruising, beading, irregular texture, ulceration, infection, allergic reaction, scar formation, incomplete augmentation, temporary nature, procedural pain.

## 2025-04-01 NOTE — PROCEDURE: DYSPORT
Show Right And Left Periorbital Units: No
R Brow Units: 0
Show Additional Area 3: Yes
Additional Area 1 Location: Chin
Consent: Written consent obtained. Risks include but not limited to lid/brow ptosis, bruising, swelling, diplopia, temporary effect, incomplete chemical denervation.
Expiration Date (Month Year): 06/30/2026
Post-Care Instructions: Patient instructed to not lie down for 4 hours and limit physical activity for 24 hours. Patient instructed not to travel by airplane for 48 hours.
Glabellar Complex Units: 30
Lot #: [id number]
Price (Use Numbers Only, No Special Characters Or $): 500
Detail Level: Detailed
Additional Area 2 Location: Orbicularis oris
Dilution (U/0.1 Cc): 10
Depressor Anguli Oris Units: 15

## 2025-08-04 ENCOUNTER — APPOINTMENT (OUTPATIENT)
Dept: URBAN - METROPOLITAN AREA CLINIC 176 | Facility: CLINIC | Age: 55
Setting detail: DERMATOLOGY
End: 2025-08-04

## 2025-08-04 DIAGNOSIS — Z41.9 ENCOUNTER FOR PROCEDURE FOR PURPOSES OTHER THAN REMEDYING HEALTH STATE, UNSPECIFIED: ICD-10-CM

## 2025-08-04 PROCEDURE — ? DYSPORT

## 2025-08-05 ENCOUNTER — APPOINTMENT (OUTPATIENT)
Dept: URBAN - METROPOLITAN AREA CLINIC 176 | Facility: CLINIC | Age: 55
Setting detail: DERMATOLOGY
End: 2025-08-05

## 2025-08-05 DIAGNOSIS — Z41.9 ENCOUNTER FOR PROCEDURE FOR PURPOSES OTHER THAN REMEDYING HEALTH STATE, UNSPECIFIED: ICD-10-CM

## 2025-08-05 PROCEDURE — ? DYSPORT
